# Patient Record
Sex: FEMALE | Race: WHITE | NOT HISPANIC OR LATINO | Employment: OTHER | URBAN - METROPOLITAN AREA
[De-identification: names, ages, dates, MRNs, and addresses within clinical notes are randomized per-mention and may not be internally consistent; named-entity substitution may affect disease eponyms.]

---

## 2022-08-11 NOTE — PROGRESS NOTES
"Subjective   History of Present Illness: Emmy Blackman is a 66 y.o. female is being seen for consultation today at the request of Munir Nair MD for lumbar radiculopathy. Today patient reports back pain and leg pain.  Patient says in the beginning of June 2022 she developed severe pain in her left buttock and into her groin and thigh.  The pain is slowly improved over the course of time.  She was worked up extensively including for her hip.  Apparently she was told that her hip was not the cause of the pain.  Patient denies any current pain radiating past her knee.  No numbness or tingling.  No weakness.  She is able to point to the location of the pain in her buttock and in her hip.    Chief Complaint   Patient presents with   • Back Pain     Initial Eval          Previous Treatment:    The following portions of the patient's history were reviewed and updated as appropriate: allergies, current medications, past family history, past medical history, past social history, past surgical history and problem list.    Review of Systems   Constitutional: Positive for activity change.   HENT: Negative.    Eyes: Negative.    Respiratory: Negative for chest tightness and shortness of breath.    Cardiovascular: Negative for chest pain.   Gastrointestinal: Negative.    Endocrine: Negative.    Genitourinary: Negative.    Musculoskeletal: Positive for back pain and myalgias.        Leg pain   Skin: Negative.    Allergic/Immunologic: Negative.    Hematological: Negative.    Psychiatric/Behavioral: Negative.        Objective     /79   Pulse 71   Temp 98.1 °F (36.7 °C)   Resp 16   Ht 165.1 cm (65\")   Wt 61.2 kg (135 lb)   BMI 22.47 kg/m²    Body mass index is 22.47 kg/m².      Neurologic Exam     Mental Status   Oriented to person, place, and time.     Motor Exam     Strength   Strength 5/5 throughout.     Sensory Exam   Light touch normal.     4-4 positive provocative SI joint maneuvers on the " left    Assessment & Plan   Independent Review of Radiographic Studies:      I personally reviewed and interpreted the images from the following studies.    MRI lumbar spine: Degenerative changes most severe at L2-3.  No significant central or foraminal stenosis.  No nerve compression    Medical Decision Making:      Emmy Blackman is a 66 y.o. female who developed acute pain referable to the SI joint on the left a couple months ago that has improved with time.  She still having significant pain when sitting for long periods of time or with specific movements.  She has not tried any conservative measures.  Notably, the hip has been ruled out as a source of the pain.  MRI of the lumbar spine does not provide explanation for the symptoms.  I believe her SI joint is her primary pain generator.  I spoke to her about undergoing an SI joint injection, but she does not want to do that at this time.  We will send her to physical therapy.  Should she decide to undergo an SI joint injection for diagnostic and therapeutic purposes she will call the office.  I will see her back as needed.      Diagnoses and all orders for this visit:    1. Sacroiliitis (HCC) (Primary)  -     Ambulatory Referral to Physical Therapy Evaluate and treat      Return if symptoms worsen or fail to improve.    This patient was examined wearing appropriate personal protective equipment.                      Dr. Avery Mar IV    08/12/22  14:16 EDT

## 2022-08-12 ENCOUNTER — OFFICE VISIT (OUTPATIENT)
Dept: NEUROSURGERY | Facility: CLINIC | Age: 67
End: 2022-08-12

## 2022-08-12 VITALS
TEMPERATURE: 98.1 F | DIASTOLIC BLOOD PRESSURE: 79 MMHG | HEIGHT: 65 IN | SYSTOLIC BLOOD PRESSURE: 132 MMHG | HEART RATE: 71 BPM | WEIGHT: 135 LBS | RESPIRATION RATE: 16 BRPM | BODY MASS INDEX: 22.49 KG/M2

## 2022-08-12 DIAGNOSIS — M46.1 SACROILIITIS: Primary | ICD-10-CM

## 2022-08-12 PROCEDURE — 99203 OFFICE O/P NEW LOW 30 MIN: CPT | Performed by: NEUROLOGICAL SURGERY

## 2022-08-12 RX ORDER — CETIRIZINE HYDROCHLORIDE 10 MG/1
10 TABLET ORAL DAILY
COMMUNITY

## 2022-08-12 RX ORDER — PANTOPRAZOLE SODIUM 40 MG/1
40 TABLET, DELAYED RELEASE ORAL
COMMUNITY
Start: 2022-05-07

## 2022-08-12 RX ORDER — ASPIRIN 81 MG/1
81 TABLET, CHEWABLE ORAL DAILY
COMMUNITY

## 2022-08-12 RX ORDER — LANOLIN ALCOHOL/MO/W.PET/CERES
5000 CREAM (GRAM) TOPICAL DAILY
COMMUNITY

## 2022-08-12 RX ORDER — METOPROLOL TARTRATE 50 MG/1
50 TABLET, FILM COATED ORAL 2 TIMES DAILY
COMMUNITY

## 2022-08-12 RX ORDER — ROPINIROLE 0.5 MG/1
0.5 TABLET, FILM COATED ORAL NIGHTLY
COMMUNITY

## 2022-08-12 RX ORDER — FOLIC ACID 1 MG/1
1 TABLET ORAL DAILY
COMMUNITY

## 2022-08-16 ENCOUNTER — TELEPHONE (OUTPATIENT)
Dept: NEUROSURGERY | Facility: CLINIC | Age: 67
End: 2022-08-16

## 2022-08-16 NOTE — TELEPHONE ENCOUNTER
Caller: Yehuda Emmy HOLLIS    Relationship: Self    Best call back number: [phone]    What is the medical concern/diagnosis:     What specialty or service is being requested: PHYSICAL THERAPY    What is the provider, practice or medical service name:     What is the office location:  [place], [place]; AS CLOSE TO HOME AS POSSIBLE    What is the office phone number:     Any additional details: PT IS ASKING TO BE REFERRED TO PHYSICAL THERAPY THAT IS CLOSEST TO HOME,  SHE KNOWS [facility]; BUT ANY WHERE IN HER AREA WOULD BE GREAT.    CALL PT WITH PHYSICAL THERAPY INFO    PT WILL NOT BE ABLE TO ANSWER CALL ON 8/17/2022    PT IS ASKING FOR THIS TO GET WORKED ON SO SHE CAN GET BACK TO WORK.

## 2022-08-17 NOTE — TELEPHONE ENCOUNTER
Called and spoke with the patient and told her I will fax over her order to [facility] in [place] and have them give her a call to get scheduled.

## 2024-03-12 ENCOUNTER — TRANSCRIBE ORDERS (OUTPATIENT)
Dept: ADMINISTRATIVE | Facility: HOSPITAL | Age: 69
End: 2024-03-12
Payer: MEDICARE

## 2024-03-12 DIAGNOSIS — R00.2 PALPITATIONS: Primary | ICD-10-CM

## 2024-04-29 ENCOUNTER — TRANSCRIBE ORDERS (OUTPATIENT)
Dept: ADMINISTRATIVE | Facility: HOSPITAL | Age: 69
End: 2024-04-29
Payer: MEDICARE

## 2024-04-29 DIAGNOSIS — R00.2 PALPITATIONS: Primary | ICD-10-CM

## 2024-05-17 ENCOUNTER — HOSPITAL ENCOUNTER (OUTPATIENT)
Dept: RESPIRATORY THERAPY | Facility: HOSPITAL | Age: 69
Discharge: HOME | End: 2024-05-17
Payer: COMMERCIAL

## 2024-05-17 DIAGNOSIS — R00.2 PALPITATIONS: ICD-10-CM
